# Patient Record
Sex: MALE | Race: WHITE
[De-identification: names, ages, dates, MRNs, and addresses within clinical notes are randomized per-mention and may not be internally consistent; named-entity substitution may affect disease eponyms.]

---

## 2020-12-08 ENCOUNTER — HOSPITAL ENCOUNTER (EMERGENCY)
Dept: HOSPITAL 72 - EMR | Age: 45
Discharge: HOME | End: 2020-12-08
Payer: COMMERCIAL

## 2020-12-08 VITALS — WEIGHT: 189 LBS | HEIGHT: 71 IN | BODY MASS INDEX: 26.46 KG/M2

## 2020-12-08 VITALS — SYSTOLIC BLOOD PRESSURE: 121 MMHG | DIASTOLIC BLOOD PRESSURE: 66 MMHG

## 2020-12-08 DIAGNOSIS — F32.9: ICD-10-CM

## 2020-12-08 DIAGNOSIS — F41.9: ICD-10-CM

## 2020-12-08 DIAGNOSIS — Z76.0: Primary | ICD-10-CM

## 2020-12-08 PROCEDURE — 99282 EMERGENCY DEPT VISIT SF MDM: CPT

## 2020-12-08 NOTE — EMERGENCY ROOM REPORT
History of Present Illness


General


Chief Complaint:  Medication Refill


Source:  Patient





Present Illness


HPI


Disclaimer: Please note that this report is being documented using [employer]AG[employer] 

technology. This can lead to erroneous entry secondary to incorrect 

interpretation by the dictating instrument.





HPI: 45-year-old male history of depression and bipolar disorder presents 

requesting medication refill.  The patient takes 150 mg Zoloft and 10 mg Abilify

daily.  He has been on this regimen for several years.  Recently had a change of

insurance and requiring him to change primary care doctors.  Unable to get a 

refill thus far.  Scheduled to see his new PMD in 2 weeks.  Ran out of 

medication 2 days ago.  Denies suicidality, homicidality, changes in behavior 

otherwise.


 


PMH: Depression, anxiety


 


PSH: Reviewed


 


Allergies: Reviewed


 


Social Hx: Reviewed


Allergies:  


Coded Allergies:  


     No Known Allergies (Unverified , 10/15/14)





COVID-19 Screening


Contact w/high risk pt:  No


Experienced COVID-19 symptoms?:  No


COVID-19 Testing performed PTA:  No





Nursing Documentation-PMH


Past Medical History:  No History, Except For





Review of Systems


All Other Systems:  negative except mentioned in HPI





Physical Exam





Vital Signs








  Date Time  Temp Pulse Resp B/P (MAP) Pulse Ox O2 Delivery O2 Flow Rate FiO2


 


12/8/20 08:56 97.9 72 20 119/65 (83) 94 Room Air  





 





General: Awake and alert, no acute distress


HEENT: NC/AT. EOMI. 


Resp: Normal work of breathing


Skin: Intact.  No abrasions, laceration or rash over the exposed skin


MSK: Normal tone and bulk. Moving all extremities.  No obvious deformity.


Neuro: Awake and alert.  Mentating appropriately.  Denies SI/HI





Medical Decision Making


Diagnostic Impression:  


   Primary Impression:  


   Encounter for medication refill


ER Course


45-year-old male presents requesting medication refill of his Abilify and 

sertraline.  Patient is awake alert, reasonable denies SI/HI.  Will provide a 2-

week refill until he can see his new PMD.  Instructed to return with new or 

worsening symptoms.  Understands and agrees with this treatment plan.





Last Vital Signs








  Date Time  Temp Pulse Resp B/P (MAP) Pulse Ox O2 Delivery O2 Flow Rate FiO2


 


12/8/20 08:56 97.9 72 20 119/65 (83) 94 Room Air  








Disposition:  HOME, SELF-CARE


Condition:  Stable


Scripts


Aripiprazole* (ABILIFY*) 10 Mg Tablet


10 MG ORAL DAILY for 14 Days, #14 TAB


   Prov: Lew Boss MD         12/8/20 


Sertraline Hcl* (ZOLOFT*) 100 Mg Tablet


100 MG ORAL DAILY for 14 Days, #1 TAB


   Prov: Lew Boss MD         12/8/20 


Sertraline Hcl* (ZOLOFT*) 50 Mg Tablet


50 MG ORAL DAILY for 14 Days, #14 TAB


   Prov: Lew Boss MD         12/8/20


Patient Instructions:  Medicine Refill at the Emergency Department





Additional Instructions:  


Please follow-up with your primary care doctor as soon as possible to discuss 

this emergency department visit and for reevaluation.  If you have any new or 

worsening symptoms please return to the emergency department for reevaluation.  





Please note that this report is being documented using DRAGON technology.


This can lead to erroneous entry secondary to incorrect interpretation by the 

dictating instrument.











Lew Boss MD               Dec 8, 2020 09:06